# Patient Record
Sex: FEMALE | Race: WHITE
[De-identification: names, ages, dates, MRNs, and addresses within clinical notes are randomized per-mention and may not be internally consistent; named-entity substitution may affect disease eponyms.]

---

## 2019-09-16 NOTE — MRI
MRI RIGHT HIP WITHOUT CONTRAST:

 

HISTORY:

Right hip pain.

 

FINDINGS:

The edema changes on the sacral side of the left SI joint are again noted.  There is some mild tendin
osis of the hamstring tendon origin.

 

The gluteus minimus and medius tendons are normal.

 

There is an anterior labral tear.  There is paralabral cyst formation seen.  Also some cystic change 
extending into the anterior-superior acetabulum associated with this finding.

 

IMPRESSION:

Anterior labral tear involving the anterior and anterior-superior labrum with paralabral cyst formati
on.

 

POS: OFF

## 2019-09-16 NOTE — MRI
MRI OF LEFT HIP PERFORMED WITHOUT CONTRAST ENHANCEMENT:

 

HISTORY: 

Chronic left hip pain.

 

FINDINGS: 

There are some minimal edema changes on the sacral side of the left SI joint involving more of the in
ferior portion of the joint space.  This could represent some minimal stress reaction.  It does not h
ave the typical appearance of a stress fracture.  No fracture line is seen.  

 

The small field of view images of the left hip show subchondral cystic change involving the superior 
weight bearing portion of the acetabulum and a mildly irregular appearance to the anterior labrum sug
gesting a chronic tear.  These changes extending into the bone could represent paralabral cyst format
ion associated with these findings.

 

The hamstring tendon origin shows some mild tendinosis.  No evidence for muscle strain.  

 

IMPRESSION: 

1.  Anterior labral tear which appears more chronic in nature with subchondral cystic changes in the 
adjacent acetabulum.

 

2.  Some minimal edema changes on the sacral side of the left sacroiliac joint which could indicate s
ome stress-type reaction.

 

POS: OFF

## 2022-01-25 ENCOUNTER — HOSPITAL ENCOUNTER (OUTPATIENT)
Dept: HOSPITAL 92 - CT | Age: 62
Discharge: HOME | End: 2022-01-25
Attending: PHYSICIAN ASSISTANT
Payer: COMMERCIAL

## 2022-01-25 DIAGNOSIS — R11.0: ICD-10-CM

## 2022-01-25 DIAGNOSIS — K59.09: ICD-10-CM

## 2022-01-25 DIAGNOSIS — R10.32: Primary | ICD-10-CM

## 2022-01-25 LAB — ESTIMATED GFR-MDRD - POC: 50

## 2022-01-25 PROCEDURE — 82565 ASSAY OF CREATININE: CPT

## 2022-01-25 PROCEDURE — 74177 CT ABD & PELVIS W/CONTRAST: CPT

## 2022-06-29 ENCOUNTER — HOSPITAL ENCOUNTER (OUTPATIENT)
Dept: HOSPITAL 92 - BICMAMMO | Age: 62
Discharge: HOME | End: 2022-06-29
Attending: STUDENT IN AN ORGANIZED HEALTH CARE EDUCATION/TRAINING PROGRAM
Payer: COMMERCIAL

## 2022-06-29 DIAGNOSIS — Z12.31: Primary | ICD-10-CM

## 2022-06-29 PROCEDURE — 77067 SCR MAMMO BI INCL CAD: CPT

## 2022-06-29 PROCEDURE — 77063 BREAST TOMOSYNTHESIS BI: CPT

## 2022-08-22 ENCOUNTER — HOSPITAL ENCOUNTER (OUTPATIENT)
Dept: HOSPITAL 92 - CTENTCT | Age: 62
Discharge: HOME | End: 2022-08-22
Attending: OTOLARYNGOLOGY
Payer: COMMERCIAL

## 2022-08-22 DIAGNOSIS — J32.9: Primary | ICD-10-CM

## 2022-08-22 PROCEDURE — 70486 CT MAXILLOFACIAL W/O DYE: CPT

## 2022-08-31 ENCOUNTER — HOSPITAL ENCOUNTER (OUTPATIENT)
Dept: HOSPITAL 92 - SDC | Age: 62
Discharge: HOME | End: 2022-08-31
Attending: OTOLARYNGOLOGY
Payer: COMMERCIAL

## 2022-08-31 VITALS — BODY MASS INDEX: 24.4 KG/M2

## 2022-08-31 DIAGNOSIS — B48.8: ICD-10-CM

## 2022-08-31 DIAGNOSIS — J33.8: ICD-10-CM

## 2022-08-31 DIAGNOSIS — Z98.890: ICD-10-CM

## 2022-08-31 DIAGNOSIS — Z79.890: ICD-10-CM

## 2022-08-31 DIAGNOSIS — J32.8: Primary | ICD-10-CM

## 2022-08-31 DIAGNOSIS — J34.2: ICD-10-CM

## 2022-08-31 DIAGNOSIS — J34.89: ICD-10-CM

## 2022-08-31 DIAGNOSIS — J30.89: ICD-10-CM

## 2022-08-31 DIAGNOSIS — J34.3: ICD-10-CM

## 2022-08-31 DIAGNOSIS — Z79.899: ICD-10-CM

## 2022-08-31 PROCEDURE — 09BQ8ZZ EXCISION OF RIGHT MAXILLARY SINUS, VIA NATURAL OR ARTIFICIAL OPENING ENDOSCOPIC: ICD-10-PCS | Performed by: OTOLARYNGOLOGY

## 2022-08-31 PROCEDURE — 09TU8ZZ RESECTION OF RIGHT ETHMOID SINUS, VIA NATURAL OR ARTIFICIAL OPENING ENDOSCOPIC: ICD-10-PCS | Performed by: OTOLARYNGOLOGY

## 2022-08-31 PROCEDURE — 09SM0ZZ REPOSITION NASAL SEPTUM, OPEN APPROACH: ICD-10-PCS | Performed by: OTOLARYNGOLOGY

## 2022-08-31 PROCEDURE — 09BX8ZZ EXCISION OF LEFT SPHENOID SINUS, VIA NATURAL OR ARTIFICIAL OPENING ENDOSCOPIC: ICD-10-PCS | Performed by: OTOLARYNGOLOGY

## 2022-08-31 PROCEDURE — C2625 STENT, NON-COR, TEM W/DEL SY: HCPCS

## 2022-08-31 PROCEDURE — 09TL8ZZ RESECTION OF NASAL TURBINATE, VIA NATURAL OR ARTIFICIAL OPENING ENDOSCOPIC: ICD-10-PCS | Performed by: OTOLARYNGOLOGY

## 2022-08-31 PROCEDURE — 09BW8ZZ EXCISION OF RIGHT SPHENOID SINUS, VIA NATURAL OR ARTIFICIAL OPENING ENDOSCOPIC: ICD-10-PCS | Performed by: OTOLARYNGOLOGY

## 2022-08-31 PROCEDURE — 09TV8ZZ RESECTION OF LEFT ETHMOID SINUS, VIA NATURAL OR ARTIFICIAL OPENING ENDOSCOPIC: ICD-10-PCS | Performed by: OTOLARYNGOLOGY

## 2022-08-31 PROCEDURE — 09BR8ZZ EXCISION OF LEFT MAXILLARY SINUS, VIA NATURAL OR ARTIFICIAL OPENING ENDOSCOPIC: ICD-10-PCS | Performed by: OTOLARYNGOLOGY

## 2022-08-31 PROCEDURE — 8E09XBZ COMPUTER ASSISTED PROCEDURE OF HEAD AND NECK REGION: ICD-10-PCS | Performed by: OTOLARYNGOLOGY

## 2024-10-25 ENCOUNTER — HOSPITAL ENCOUNTER (OUTPATIENT)
Dept: HOSPITAL 92 - BICMAMMO | Age: 64
Discharge: HOME | End: 2024-10-25
Payer: MEDICARE

## 2024-10-25 DIAGNOSIS — Z12.31: Primary | ICD-10-CM

## 2024-10-25 PROCEDURE — 77063 BREAST TOMOSYNTHESIS BI: CPT

## 2024-10-25 PROCEDURE — 77067 SCR MAMMO BI INCL CAD: CPT
